# Patient Record
Sex: FEMALE | Race: WHITE | NOT HISPANIC OR LATINO
[De-identification: names, ages, dates, MRNs, and addresses within clinical notes are randomized per-mention and may not be internally consistent; named-entity substitution may affect disease eponyms.]

---

## 2018-11-01 PROBLEM — Z00.00 ENCOUNTER FOR PREVENTIVE HEALTH EXAMINATION: Status: ACTIVE | Noted: 2018-11-01

## 2018-11-05 ENCOUNTER — APPOINTMENT (OUTPATIENT)
Dept: OBGYN | Facility: CLINIC | Age: 28
End: 2018-11-05
Payer: COMMERCIAL

## 2018-11-05 VITALS — DIASTOLIC BLOOD PRESSURE: 78 MMHG | SYSTOLIC BLOOD PRESSURE: 100 MMHG

## 2018-11-05 DIAGNOSIS — Z80.41 FAMILY HISTORY OF MALIGNANT NEOPLASM OF OVARY: ICD-10-CM

## 2018-11-05 DIAGNOSIS — Z87.09 PERSONAL HISTORY OF OTHER DISEASES OF THE RESPIRATORY SYSTEM: ICD-10-CM

## 2018-11-05 DIAGNOSIS — E28.2 POLYCYSTIC OVARIAN SYNDROME: ICD-10-CM

## 2018-11-05 DIAGNOSIS — N64.4 MASTODYNIA: ICD-10-CM

## 2018-11-05 DIAGNOSIS — R23.2 FLUSHING: ICD-10-CM

## 2018-11-05 PROCEDURE — 99214 OFFICE O/P EST MOD 30 MIN: CPT

## 2018-11-06 PROBLEM — Z80.41 FAMILY HISTORY OF MALIGNANT NEOPLASM OF OVARY: Status: ACTIVE | Noted: 2018-11-05

## 2018-11-27 ENCOUNTER — FORM ENCOUNTER (OUTPATIENT)
Age: 28
End: 2018-11-27

## 2018-11-28 ENCOUNTER — OUTPATIENT (OUTPATIENT)
Dept: OUTPATIENT SERVICES | Facility: HOSPITAL | Age: 28
LOS: 1 days | End: 2018-11-28
Payer: COMMERCIAL

## 2018-11-28 ENCOUNTER — APPOINTMENT (OUTPATIENT)
Dept: ULTRASOUND IMAGING | Facility: CLINIC | Age: 28
End: 2018-11-28
Payer: COMMERCIAL

## 2018-11-28 DIAGNOSIS — Z00.8 ENCOUNTER FOR OTHER GENERAL EXAMINATION: ICD-10-CM

## 2018-11-28 PROCEDURE — 76830 TRANSVAGINAL US NON-OB: CPT

## 2018-11-28 PROCEDURE — 76830 TRANSVAGINAL US NON-OB: CPT | Mod: 26

## 2018-11-28 PROCEDURE — 76856 US EXAM PELVIC COMPLETE: CPT

## 2018-11-28 PROCEDURE — 76856 US EXAM PELVIC COMPLETE: CPT | Mod: 26

## 2018-11-29 ENCOUNTER — TRANSCRIPTION ENCOUNTER (OUTPATIENT)
Age: 28
End: 2018-11-29

## 2018-11-30 LAB
ESTRADIOL SERPL-MCNC: 53 PG/ML
FSH SERPL-MCNC: 6.5 IU/L
LH SERPL-ACNC: 7.6 IU/L
PROLACTIN SERPL-MCNC: 30.1 NG/ML
TSH SERPL-ACNC: 2.67 UIU/ML

## 2018-12-03 ENCOUNTER — APPOINTMENT (OUTPATIENT)
Dept: OBGYN | Facility: CLINIC | Age: 28
End: 2018-12-03

## 2018-12-06 ENCOUNTER — APPOINTMENT (OUTPATIENT)
Dept: OBGYN | Facility: CLINIC | Age: 28
End: 2018-12-06
Payer: COMMERCIAL

## 2018-12-06 PROCEDURE — 99214 OFFICE O/P EST MOD 30 MIN: CPT

## 2018-12-31 LAB
17OHP SERPL-MCNC: 28 NG/DL
ESTRADIOL SERPL HS-MCNC: 60 PG/ML
PROLACTIN SERPL-MCNC: 22.3 NG/ML
TESTOST SERPL-MCNC: 19.4 NG/DL

## 2019-01-02 ENCOUNTER — RESULT REVIEW (OUTPATIENT)
Age: 29
End: 2019-01-02

## 2019-03-14 ENCOUNTER — TRANSCRIPTION ENCOUNTER (OUTPATIENT)
Age: 29
End: 2019-03-14

## 2019-07-18 ENCOUNTER — APPOINTMENT (OUTPATIENT)
Dept: OBGYN | Facility: CLINIC | Age: 29
End: 2019-07-18
Payer: COMMERCIAL

## 2019-07-18 VITALS
BODY MASS INDEX: 20.89 KG/M2 | WEIGHT: 130 LBS | SYSTOLIC BLOOD PRESSURE: 120 MMHG | DIASTOLIC BLOOD PRESSURE: 82 MMHG | HEIGHT: 66 IN

## 2019-07-18 DIAGNOSIS — Z30.41 ENCOUNTER FOR SURVEILLANCE OF CONTRACEPTIVE PILLS: ICD-10-CM

## 2019-07-18 DIAGNOSIS — R31.29 OTHER MICROSCOPIC HEMATURIA: ICD-10-CM

## 2019-07-18 DIAGNOSIS — N83.299 OTHER OVARIAN CYST, UNSPECIFIED SIDE: ICD-10-CM

## 2019-07-18 LAB
BILIRUB UR QL STRIP: NORMAL
CLARITY UR: CLEAR
COLLECTION METHOD: NORMAL
GLUCOSE UR-MCNC: NORMAL
HCG UR QL: 0.2 EU/DL
HCG UR QL: NEGATIVE
HGB UR QL STRIP.AUTO: NORMAL
KETONES UR-MCNC: NORMAL
LEUKOCYTE ESTERASE UR QL STRIP: NORMAL
NITRITE UR QL STRIP: NORMAL
PH UR STRIP: 7
PROT UR STRIP-MCNC: NORMAL
QUALITY CONTROL: YES
SP GR UR STRIP: 1.01

## 2019-07-18 PROCEDURE — 99215 OFFICE O/P EST HI 40 MIN: CPT

## 2019-07-18 RX ORDER — LEVONORGESTREL AND ETHINYL ESTRADIOL 0.1-0.02MG
0.1-2 KIT ORAL
Refills: 0 | Status: COMPLETED | COMMUNITY
End: 2018-10-18

## 2019-07-18 NOTE — PROCEDURE
[Amenorrhea] : Amenorrhea [Transvaginal Ultrasound] : transvaginal ultrasound [Present] : uterus present [No Fibroid(s)] : no fibroid(s) [L: ___ cm] : L: [unfilled] cm [W: ___cm] : W: [unfilled] cm [H: ___ cm] : H: [unfilled] cm [FreeTextEntry3] : Complex 25 x 18 x 21 cm left ovarian cyst consistent with hemorrhaghic cyst or endometrioma. [FreeTextEntry5] : smooth muscle wall. ECHO 8 mm trilaminar, no polyps [FreeTextEntry6] : 10-15 3 mm follicles in right ovary. Left ovary with same few cyst but larger at 5-6 mm with complex cyst noted above. [FreeTextEntry7] : 2.9 x 1.9 x 1.5 cm  [FreeTextEntry8] : 3.6 x 2.7 x 2.5 cm  [FreeTextEntry4] : Left endometrioma or left ovarian cyst

## 2019-07-18 NOTE — PHYSICAL EXAM
[Normal] : uterus [No Bleeding] : there was no active vaginal bleeding [Anteversion] : anteverted [Tenderness] : tender [Enlarged ___ wks] : enlarged [unfilled] ~Uweeks [Mass ___ cm] : a [unfilled] ~Ucm uterine mass was palpated [Uterine Adnexae] : were not tender and not enlarged [No Tenderness] : no rectal tenderness [Occult Blood] : occult blood test from digital rectal exam was negative [FreeTextEntry7] : left adnexal mass and in cul de sac

## 2019-07-18 NOTE — CHIEF COMPLAINT
[Follow Up] : follow up GYN visit [FreeTextEntry1] : 30 yo G0 with LMP 4/20/19 presents with low back  and low abd pain. Since stopping OCP 11/2018 as it was not decreasing dysmenorrhea, pt has had 40-45 day cycles. With current amenorrhea, pt has pelvic pain thru month.

## 2019-07-19 LAB
C TRACH RRNA SPEC QL NAA+PROBE: NOT DETECTED
N GONORRHOEA RRNA SPEC QL NAA+PROBE: NOT DETECTED
SOURCE AMPLIFICATION: NORMAL

## 2019-08-27 ENCOUNTER — FORM ENCOUNTER (OUTPATIENT)
Age: 29
End: 2019-08-27

## 2019-08-28 ENCOUNTER — APPOINTMENT (OUTPATIENT)
Dept: ULTRASOUND IMAGING | Facility: CLINIC | Age: 29
End: 2019-08-28
Payer: COMMERCIAL

## 2019-08-28 ENCOUNTER — OUTPATIENT (OUTPATIENT)
Dept: OUTPATIENT SERVICES | Facility: HOSPITAL | Age: 29
LOS: 1 days | End: 2019-08-28
Payer: COMMERCIAL

## 2019-08-28 DIAGNOSIS — Z00.8 ENCOUNTER FOR OTHER GENERAL EXAMINATION: ICD-10-CM

## 2019-08-28 PROCEDURE — 76856 US EXAM PELVIC COMPLETE: CPT | Mod: 26

## 2019-08-28 PROCEDURE — 76775 US EXAM ABDO BACK WALL LIM: CPT

## 2019-08-28 PROCEDURE — 76856 US EXAM PELVIC COMPLETE: CPT

## 2019-08-28 PROCEDURE — 76775 US EXAM ABDO BACK WALL LIM: CPT | Mod: 26

## 2020-06-01 ENCOUNTER — APPOINTMENT (OUTPATIENT)
Dept: OBGYN | Facility: CLINIC | Age: 30
End: 2020-06-01
Payer: MEDICAID

## 2020-06-01 VITALS
DIASTOLIC BLOOD PRESSURE: 62 MMHG | BODY MASS INDEX: 20.09 KG/M2 | HEIGHT: 66 IN | SYSTOLIC BLOOD PRESSURE: 110 MMHG | WEIGHT: 125 LBS | TEMPERATURE: 98.2 F

## 2020-06-01 DIAGNOSIS — N92.6 IRREGULAR MENSTRUATION, UNSPECIFIED: ICD-10-CM

## 2020-06-01 LAB
HCG UR QL: NEGATIVE
HCG UR QL: NEGATIVE
QUALITY CONTROL: YES
QUALITY CONTROL: YES

## 2020-06-01 PROCEDURE — 99395 PREV VISIT EST AGE 18-39: CPT

## 2020-06-01 PROCEDURE — 81025 URINE PREGNANCY TEST: CPT

## 2020-06-01 NOTE — PHYSICAL EXAM
[Normal] : uterus [Uterine Adnexae] : were not tender and not enlarged [No Bleeding] : there was no active vaginal bleeding [FreeTextEntry5] : friable cervix

## 2020-06-01 NOTE — COUNSELING
[Menstrual Calendar] : menstrual calendar [Fertility Options] : fertility options [Contraception] : contraception

## 2020-06-01 NOTE — CHIEF COMPLAINT
[Follow Up] : follow up GYN visit [FreeTextEntry1] : 29 yo G0 with LM  5/2/20 presents b/c she began spotting 5/23 following intercourse and again on 5/31 following intercourse.

## 2020-06-05 LAB
C TRACH RRNA SPEC QL NAA+PROBE: NOT DETECTED
HPV HIGH+LOW RISK DNA PNL CVX: NOT DETECTED
N GONORRHOEA RRNA SPEC QL NAA+PROBE: NOT DETECTED
SOURCE AMPLIFICATION: NORMAL

## 2020-07-27 ENCOUNTER — APPOINTMENT (OUTPATIENT)
Dept: OBGYN | Facility: CLINIC | Age: 30
End: 2020-07-27

## 2021-01-28 ENCOUNTER — APPOINTMENT (OUTPATIENT)
Dept: OBGYN | Facility: CLINIC | Age: 31
End: 2021-01-28
Payer: MEDICAID

## 2021-01-28 VITALS — WEIGHT: 125 LBS | BODY MASS INDEX: 20.09 KG/M2 | HEIGHT: 66 IN

## 2021-01-28 DIAGNOSIS — N91.2 AMENORRHEA, UNSPECIFIED: ICD-10-CM

## 2021-01-28 DIAGNOSIS — R10.2 PELVIC AND PERINEAL PAIN: ICD-10-CM

## 2021-01-28 LAB
HCG UR QL: POSITIVE
QUALITY CONTROL: YES

## 2021-01-28 PROCEDURE — 99214 OFFICE O/P EST MOD 30 MIN: CPT

## 2021-01-28 PROCEDURE — 99072 ADDL SUPL MATRL&STAF TM PHE: CPT

## 2021-01-28 PROCEDURE — 81025 URINE PREGNANCY TEST: CPT

## 2021-01-28 NOTE — DISCUSSION/SUMMARY
[FreeTextEntry1] : Unexpected 17-18 wk IU pregnancy by US\par pregnancy options discussed with patient\par cont MV until decision made. Offered PNV\par Phone number of person who would do second trimester surgical TOP.\par Options and testing including anatomy US for mid second trimester\par \par RTO 1 week for NPN or following TOP.

## 2021-01-28 NOTE — PROCEDURE
[Pelvic Pain] : pelvic pain [Amenorrhea] : Amenorrhea [Transvaginal Ultrasound] : transvaginal ultrasound [No Fibroid(s)] : no fibroid(s) [Not Visualized] : not visualized

## 2021-01-28 NOTE — HISTORY OF PRESENT ILLNESS
[FreeTextEntry1] : 31 yo with LMP 12/25/20 presents with painful breast swelling and LLQ pain.\par \par Prior ovarian cyst resolved.\par Pt stopped OCP June 2020 as she noted less pain when off pill. Pt and fiance had been using condoms.\par \par Menses off pill are 40-50 days at times. Same BF 15 years. Now fiance, but was not trying to conceive yet. \par \par Bleeding 12/25 was light but cycle have  been light recently.  [PapSmeardate] : 6/1/20 [GonorrheaDate] : 6/1/20 [ChlamydiaDate] : 6/1/20

## 2021-01-28 NOTE — PROCEDURE
[Affirm (Triple Culture)] : Affirm (triple culture) [FreeTextEntry3] : intercerebellar 17.5wks\par BPD 18.1wks 4 cm \par AC 16.6wks\par Fl    18.5wks [FreeTextEntry4] : 17 w 6 d viable IUP with movement. EDD7/2/21\par Visualized FHR

## 2021-01-28 NOTE — PHYSICAL EXAM
[Labia Majora] : normal [Labia Minora] : normal [Normal] : normal [Enlarged ___ wks] : enlarged [unfilled] ~Uweeks [Uterine Adnexae] : normal

## 2021-02-04 ENCOUNTER — APPOINTMENT (OUTPATIENT)
Dept: OBGYN | Facility: CLINIC | Age: 31
End: 2021-02-04

## 2021-02-04 ENCOUNTER — OUTPATIENT (OUTPATIENT)
Dept: OUTPATIENT SERVICES | Facility: HOSPITAL | Age: 31
LOS: 1 days | End: 2021-02-04
Payer: MEDICAID

## 2021-02-04 VITALS
TEMPERATURE: 98 F | OXYGEN SATURATION: 100 % | HEART RATE: 72 BPM | DIASTOLIC BLOOD PRESSURE: 56 MMHG | RESPIRATION RATE: 14 BRPM | HEIGHT: 66 IN | SYSTOLIC BLOOD PRESSURE: 116 MMHG | WEIGHT: 125 LBS

## 2021-02-04 DIAGNOSIS — Z01.818 ENCOUNTER FOR OTHER PREPROCEDURAL EXAMINATION: ICD-10-CM

## 2021-02-04 DIAGNOSIS — Z33.2 ENCOUNTER FOR ELECTIVE TERMINATION OF PREGNANCY: ICD-10-CM

## 2021-02-04 DIAGNOSIS — Z98.890 OTHER SPECIFIED POSTPROCEDURAL STATES: Chronic | ICD-10-CM

## 2021-02-04 LAB
ANION GAP SERPL CALC-SCNC: 14 MMOL/L — SIGNIFICANT CHANGE UP (ref 5–17)
APTT BLD: 27.1 SEC — LOW (ref 27.5–35.5)
BLD GP AB SCN SERPL QL: NEGATIVE — SIGNIFICANT CHANGE UP
BUN SERPL-MCNC: 8 MG/DL — SIGNIFICANT CHANGE UP (ref 7–23)
CALCIUM SERPL-MCNC: 9.1 MG/DL — SIGNIFICANT CHANGE UP (ref 8.4–10.5)
CHLORIDE SERPL-SCNC: 101 MMOL/L — SIGNIFICANT CHANGE UP (ref 96–108)
CO2 SERPL-SCNC: 20 MMOL/L — LOW (ref 22–31)
CREAT SERPL-MCNC: 0.58 MG/DL — SIGNIFICANT CHANGE UP (ref 0.5–1.3)
FIBRINOGEN PPP-MCNC: 489 MG/DL — SIGNIFICANT CHANGE UP (ref 290–520)
GLUCOSE SERPL-MCNC: 79 MG/DL — SIGNIFICANT CHANGE UP (ref 70–99)
HCT VFR BLD CALC: 31.7 % — LOW (ref 34.5–45)
HGB BLD-MCNC: 10.7 G/DL — LOW (ref 11.5–15.5)
INR BLD: 0.97 RATIO — SIGNIFICANT CHANGE UP (ref 0.88–1.16)
MCHC RBC-ENTMCNC: 30.6 PG — SIGNIFICANT CHANGE UP (ref 27–34)
MCHC RBC-ENTMCNC: 33.8 GM/DL — SIGNIFICANT CHANGE UP (ref 32–36)
MCV RBC AUTO: 90.6 FL — SIGNIFICANT CHANGE UP (ref 80–100)
NRBC # BLD: 0 /100 WBCS — SIGNIFICANT CHANGE UP (ref 0–0)
PLATELET # BLD AUTO: 246 K/UL — SIGNIFICANT CHANGE UP (ref 150–400)
POTASSIUM SERPL-MCNC: 3.3 MMOL/L — LOW (ref 3.5–5.3)
POTASSIUM SERPL-SCNC: 3.3 MMOL/L — LOW (ref 3.5–5.3)
PROTHROM AB SERPL-ACNC: 11.6 SEC — SIGNIFICANT CHANGE UP (ref 10.6–13.6)
RBC # BLD: 3.5 M/UL — LOW (ref 3.8–5.2)
RBC # FLD: 12.3 % — SIGNIFICANT CHANGE UP (ref 10.3–14.5)
RH IG SCN BLD-IMP: POSITIVE — SIGNIFICANT CHANGE UP
SODIUM SERPL-SCNC: 135 MMOL/L — SIGNIFICANT CHANGE UP (ref 135–145)
WBC # BLD: 10.84 K/UL — HIGH (ref 3.8–10.5)
WBC # FLD AUTO: 10.84 K/UL — HIGH (ref 3.8–10.5)

## 2021-02-04 PROCEDURE — 85027 COMPLETE CBC AUTOMATED: CPT

## 2021-02-04 PROCEDURE — 86850 RBC ANTIBODY SCREEN: CPT

## 2021-02-04 PROCEDURE — 85384 FIBRINOGEN ACTIVITY: CPT

## 2021-02-04 PROCEDURE — 86900 BLOOD TYPING SEROLOGIC ABO: CPT

## 2021-02-04 PROCEDURE — 80048 BASIC METABOLIC PNL TOTAL CA: CPT

## 2021-02-04 PROCEDURE — G0463: CPT

## 2021-02-04 PROCEDURE — 85730 THROMBOPLASTIN TIME PARTIAL: CPT

## 2021-02-04 PROCEDURE — 86901 BLOOD TYPING SEROLOGIC RH(D): CPT

## 2021-02-04 PROCEDURE — 85610 PROTHROMBIN TIME: CPT

## 2021-02-04 RX ORDER — SODIUM CHLORIDE 9 MG/ML
3 INJECTION INTRAMUSCULAR; INTRAVENOUS; SUBCUTANEOUS EVERY 8 HOURS
Refills: 0 | Status: DISCONTINUED | OUTPATIENT
Start: 2021-02-09 | End: 2021-02-23

## 2021-02-04 RX ORDER — LIDOCAINE HCL 20 MG/ML
0.2 VIAL (ML) INJECTION ONCE
Refills: 0 | Status: DISCONTINUED | OUTPATIENT
Start: 2021-02-09 | End: 2021-02-23

## 2021-02-04 RX ORDER — VANCOMYCIN HCL 1 G
750 VIAL (EA) INTRAVENOUS ONCE
Refills: 0 | Status: DISCONTINUED | OUTPATIENT
Start: 2021-02-09 | End: 2021-02-23

## 2021-02-04 NOTE — H&P PST ADULT - HISTORY OF PRESENT ILLNESS
30 yr old female with PMH of  30 yr old female (LMP: 2020),  with PMH of Asthma (mild: last asthma attack was more than 5-6 yrs ago), Ovarian cyst. Pt offers no complaints at this time. Pt evaluated by Dr. Jimenez for a scheduled D&E with Ultrasound guidance on 21.    **Covid swab scheduled on 21 at [facility]*

## 2021-02-04 NOTE — H&P PST ADULT - NSICDXFAMILYHX_GEN_ALL_CORE_FT
FAMILY HISTORY:  Family history of ulcerative colitis, mother: has Ostomy  FH: asthma, brother  FH: HTN (hypertension), Mother & Father  FH: prostate cancer, father

## 2021-02-04 NOTE — H&P PST ADULT - NSICDXPASTSURGICALHX_GEN_ALL_CORE_FT
PAST SURGICAL HISTORY:  S/P knee surgery B/L arthroscopic left 2003, right  2004    Status post D&C polypectomy

## 2021-02-04 NOTE — H&P PST ADULT - NSICDXPROBLEM_GEN_ALL_CORE_FT
PROBLEM DIAGNOSES  Problem: Encounter for elective termination of pregnancy  Assessment and Plan: -scheduled for a D&E with Ultrasound Guidance on 2/9/21  -preop instructions given  -Labs: CBC, BMP, PTT/PT/INR, Fibrinogen done in PST

## 2021-02-04 NOTE — H&P PST ADULT - NSANTHOSAYNRD_GEN_A_CORE
No. SHEELA screening performed.  STOP BANG Legend: 0-2 = LOW Risk; 3-4 = INTERMEDIATE Risk; 5-8 = HIGH Risk

## 2021-02-06 ENCOUNTER — OUTPATIENT (OUTPATIENT)
Dept: OUTPATIENT SERVICES | Facility: HOSPITAL | Age: 31
LOS: 1 days | End: 2021-02-06
Payer: MEDICAID

## 2021-02-06 DIAGNOSIS — Z11.52 ENCOUNTER FOR SCREENING FOR COVID-19: ICD-10-CM

## 2021-02-06 DIAGNOSIS — Z98.890 OTHER SPECIFIED POSTPROCEDURAL STATES: Chronic | ICD-10-CM

## 2021-02-06 LAB — SARS-COV-2 RNA SPEC QL NAA+PROBE: SIGNIFICANT CHANGE UP

## 2021-02-06 PROCEDURE — C9803: CPT

## 2021-02-06 PROCEDURE — U0003: CPT

## 2021-02-06 PROCEDURE — U0005: CPT

## 2021-02-08 ENCOUNTER — TRANSCRIPTION ENCOUNTER (OUTPATIENT)
Age: 31
End: 2021-02-08

## 2021-02-08 ENCOUNTER — APPOINTMENT (OUTPATIENT)
Dept: OBGYN | Facility: CLINIC | Age: 31
End: 2021-02-08
Payer: MEDICAID

## 2021-02-08 VITALS
TEMPERATURE: 97.3 F | HEIGHT: 66 IN | WEIGHT: 125 LBS | SYSTOLIC BLOOD PRESSURE: 100 MMHG | BODY MASS INDEX: 20.09 KG/M2 | DIASTOLIC BLOOD PRESSURE: 60 MMHG

## 2021-02-08 DIAGNOSIS — Z34.90 ENCOUNTER FOR SUPERVISION OF NORMAL PREGNANCY, UNSPECIFIED, UNSPECIFIED TRIMESTER: ICD-10-CM

## 2021-02-08 DIAGNOSIS — N92.6 IRREGULAR MENSTRUATION, UNSPECIFIED: ICD-10-CM

## 2021-02-08 PROCEDURE — 99204 OFFICE O/P NEW MOD 45 MIN: CPT | Mod: TH,25

## 2021-02-08 PROCEDURE — 76815 OB US LIMITED FETUS(S): CPT

## 2021-02-08 PROCEDURE — 99072 ADDL SUPL MATRL&STAF TM PHE: CPT

## 2021-02-08 RX ORDER — MISOPROSTOL 200 UG/1
200 TABLET ORAL
Qty: 2 | Refills: 0 | Status: ACTIVE | COMMUNITY
Start: 2021-02-08 | End: 1900-01-01

## 2021-02-08 NOTE — PROCEDURE
[Transabdominal OB Sonogram] : Transabdominal OB Sonogram [Intrauterine Pregnancy] : intrauterine pregnancy [Fetal Heart] : fetal heart present [FreeTextEntry1] : BPD: 4.22cm\par FL: 3.00cm\par AC: 12.9cm

## 2021-02-08 NOTE — HISTORY OF PRESENT ILLNESS
[FreeTextEntry1] : 31yo  LMP 2020 at 18w5d EGA presenting for consultation for D&E. Denies abdominal pain, vaginal bleeding, leakage of fluid.\par \par OBHx: Denies\par Gynhx: +hx of cysts, PCOS; in the past few months, periods are more regular q30day\par Denies hx of abnormal paps, STIs, Fibroids, Endometriosis\par \par PMH: Asthma, rarely uses home Proair\par PSH: Vaginal polypectomy ()

## 2021-02-08 NOTE — PHYSICAL EXAM
[Appropriately responsive] : appropriately responsive [Alert] : alert [No Acute Distress] : no acute distress [Soft] : soft [Non-tender] : non-tender [Non-distended] : non-distended [Oriented x3] : oriented x3 [Labia Majora] : normal [Labia Minora] : normal [Normal] : normal [Normal Position] : in a normal position

## 2021-02-08 NOTE — DISCUSSION/SUMMARY
[FreeTextEntry1] : 29yo  LMP 2020 at 18w5d EGA presenting for consultation for D&E. \par \par 1.	Dilation and Evacuation \par -All available records and ultrasounds have been reviewed \par - Pt does not desire induction of labor\par - reviewed patient’s responsibility to contact insurance company for coverage confirmation\par -All consents signed today, all questions/concerns addressed\par - Patient offered pamphlet for support services: patient accepted\par - Patient offered fetal footprints: patient declined\par - Genetics: patient declines\par - Reviewed disposal of remains, hospital vs. private burial.  Patient desires routine hospital disposal\par \par 2. Surgery scheduling\par - Patient to be precertified for D+E\par - D+E scheduled for  in ambulatory surgery\par - PST completed\par \par 3. ID/Cervical prep\par - GC/CT obtained\par - rx misoprostol 400 mcg to be placed buccally 3 hours pre op\par -mifepristone administered in office today: #[phone]\par - pt declines HIV/RPR/hepatitis testing\par - Flagyl 500 mg \par - Prescription for Percocet 5/325 mg #6  with no refills given\par - Istop authorization #: ____________\par - Reviewed Ibuprofen 600 mg po q 6 hours \par \par 4. Labs/Blood type\par - to get CBC, T+S at PST’s\par - Rhogam pending results\par \par 5. Contraception\par - Patient counseled on all contraceptive options\par - Patient does not desire post termination contraception\par \par 6. Post-op\par - Post-operative phone call virtual visit to be scheduled in 2 weeks\par - Post-operative instruction sheet given, reviewed bleeding and infection precautions\par - Provided 24 hour contact phone number\par - All questions/concerns addressed\par

## 2021-02-09 ENCOUNTER — RESULT REVIEW (OUTPATIENT)
Age: 31
End: 2021-02-09

## 2021-02-09 ENCOUNTER — APPOINTMENT (OUTPATIENT)
Dept: OBGYN | Facility: CLINIC | Age: 31
End: 2021-02-09

## 2021-02-09 ENCOUNTER — OUTPATIENT (OUTPATIENT)
Dept: OUTPATIENT SERVICES | Facility: HOSPITAL | Age: 31
LOS: 1 days | End: 2021-02-09
Payer: MEDICAID

## 2021-02-09 VITALS
RESPIRATION RATE: 14 BRPM | SYSTOLIC BLOOD PRESSURE: 104 MMHG | HEART RATE: 65 BPM | TEMPERATURE: 98 F | DIASTOLIC BLOOD PRESSURE: 61 MMHG | OXYGEN SATURATION: 100 %

## 2021-02-09 VITALS
RESPIRATION RATE: 14 BRPM | WEIGHT: 125 LBS | TEMPERATURE: 98 F | OXYGEN SATURATION: 100 % | HEART RATE: 73 BPM | SYSTOLIC BLOOD PRESSURE: 101 MMHG | HEIGHT: 66 IN | DIASTOLIC BLOOD PRESSURE: 61 MMHG

## 2021-02-09 DIAGNOSIS — Z98.890 OTHER SPECIFIED POSTPROCEDURAL STATES: Chronic | ICD-10-CM

## 2021-02-09 DIAGNOSIS — Z33.2 ENCOUNTER FOR ELECTIVE TERMINATION OF PREGNANCY: ICD-10-CM

## 2021-02-09 LAB
BLD GP AB SCN SERPL QL: NEGATIVE — SIGNIFICANT CHANGE UP
RH IG SCN BLD-IMP: POSITIVE — SIGNIFICANT CHANGE UP

## 2021-02-09 PROCEDURE — 88305 TISSUE EXAM BY PATHOLOGIST: CPT

## 2021-02-09 PROCEDURE — 76998 US GUIDE INTRAOP: CPT | Mod: 26

## 2021-02-09 PROCEDURE — 86850 RBC ANTIBODY SCREEN: CPT

## 2021-02-09 PROCEDURE — 59840 INDUCED ABORTION D&C: CPT

## 2021-02-09 PROCEDURE — 86900 BLOOD TYPING SEROLOGIC ABO: CPT

## 2021-02-09 PROCEDURE — 86901 BLOOD TYPING SEROLOGIC RH(D): CPT

## 2021-02-09 PROCEDURE — 59841 INDUCED ABORTION DILAT&EVAC: CPT

## 2021-02-09 PROCEDURE — 88305 TISSUE EXAM BY PATHOLOGIST: CPT | Mod: 26

## 2021-02-09 RX ORDER — ACETAMINOPHEN 500 MG
2 TABLET ORAL
Qty: 0 | Refills: 0 | DISCHARGE

## 2021-02-09 RX ORDER — ONDANSETRON 8 MG/1
4 TABLET, FILM COATED ORAL ONCE
Refills: 0 | Status: DISCONTINUED | OUTPATIENT
Start: 2021-02-09 | End: 2021-02-23

## 2021-02-09 RX ORDER — HYDROMORPHONE HYDROCHLORIDE 2 MG/ML
0.5 INJECTION INTRAMUSCULAR; INTRAVENOUS; SUBCUTANEOUS
Refills: 0 | Status: DISCONTINUED | OUTPATIENT
Start: 2021-02-09 | End: 2021-02-09

## 2021-02-09 RX ORDER — SODIUM CHLORIDE 9 MG/ML
1000 INJECTION, SOLUTION INTRAVENOUS
Refills: 0 | Status: DISCONTINUED | OUTPATIENT
Start: 2021-02-09 | End: 2021-02-23

## 2021-02-09 RX ORDER — IBUPROFEN 200 MG
1 TABLET ORAL
Qty: 0 | Refills: 0 | DISCHARGE

## 2021-02-09 NOTE — BRIEF OPERATIVE NOTE - NSICDXBRIEFPROCEDURE_GEN_ALL_CORE_FT
PROCEDURES:  Dilation and evacuation of uterus using suction with ultrasound guidance 09-Feb-2021 15:05:54  Frankel, Robyn   PROCEDURES:  Induced  by dilation and evacuation 2021 16:06:44 1. examination under anesthesia  2. standard dilation and evacuation under ultrasound guidance Mirna Douglass

## 2021-02-09 NOTE — ASU DISCHARGE PLAN (ADULT/PEDIATRIC) - ACTIVITY LEVEL
No excercise/No heavy lifting/No sports/gym/Nothing per rectum/Nothing per vagina/No tub baths/No douching/No tampons/No intercourse

## 2021-02-09 NOTE — BRIEF OPERATIVE NOTE - OPERATION/FINDINGS
EUA revealed anteverted uterus, approximately 18wk size  Fetus and placenta c/w gestational age; all fetal parts accounted for. Entire procedure completed under ultrasound guidance. Thin endometrial strip at completion of procedure.   Monsel solution applied to cervix for hemostasis. Good hemostasis noted. anteverted uterus, approximately 18-19wk size  Fetus and placenta c/w gestational age; all fetal parts accounted for. Entire procedure completed under ultrasound guidance. Thin endometrial strip at completion of procedure.   Monsel solution applied to cervix for hemostasis. Good hemostasis noted. anteverted uterus, approximately 18-19wk size.  fetus and placenta AGA, all fetal parts accounted for. Monsel solution applied to cervix. BT: O+

## 2021-02-09 NOTE — ASU DISCHARGE PLAN (ADULT/PEDIATRIC) - ASU DC SPECIAL INSTRUCTIONSFT
Please call to schedule an appointment in 2 weeks with Dr. Douglass. Please call to schedule an appointment in 2 weeks with Dr. Douglass.    Please note: a brown solution was placed on your cervix to help prevent bleeding. You may experience some brown-floyd discharge for the next 4-5 days. This is normal and to be expected.

## 2021-02-09 NOTE — ASU DISCHARGE PLAN (ADULT/PEDIATRIC) - CARE PROVIDER_API CALL
Mirna Douglass)  OBSGYN  General  [address] [place] [id number]  Phone: [phone]  Fax: [phone]  Established Patient  Follow Up Time: 2 weeks

## 2021-02-09 NOTE — BRIEF OPERATIVE NOTE - NSICDXBRIEFPREOP_GEN_ALL_CORE_FT
PRE-OP DIAGNOSIS:  Unplanned pregnancy 09-Feb-2021 15:06:05  Frankel, Robyn   PRE-OP DIAGNOSIS:  Unplanned pregnancy 09-Feb-2021 15:06:05 1. IUP @ 18 6/7 weeks  2. unplanned, undesired pregnancy Frankel, Robyn

## 2021-02-11 ENCOUNTER — NON-APPOINTMENT (OUTPATIENT)
Age: 31
End: 2021-02-11

## 2021-02-16 PROBLEM — J45.909 UNSPECIFIED ASTHMA, UNCOMPLICATED: Chronic | Status: ACTIVE | Noted: 2021-02-04

## 2021-02-16 PROBLEM — N83.209 UNSPECIFIED OVARIAN CYST, UNSPECIFIED SIDE: Chronic | Status: ACTIVE | Noted: 2021-02-04

## 2021-02-24 ENCOUNTER — APPOINTMENT (OUTPATIENT)
Dept: OBGYN | Facility: CLINIC | Age: 31
End: 2021-02-24
Payer: MEDICAID

## 2021-02-24 DIAGNOSIS — Z09 ENCOUNTER FOR FOLLOW-UP EXAMINATION AFTER COMPLETED TREATMENT FOR CONDITIONS OTHER THAN MALIGNANT NEOPLASM: ICD-10-CM

## 2021-02-24 PROCEDURE — 99212 OFFICE O/P EST SF 10 MIN: CPT | Mod: 95

## 2021-02-27 LAB — SURGICAL PATHOLOGY STUDY: SIGNIFICANT CHANGE UP

## 2021-03-11 ENCOUNTER — NON-APPOINTMENT (OUTPATIENT)
Age: 31
End: 2021-03-11

## 2023-03-22 NOTE — COUNSELING
[Vitamins/Supplements] : vitamins/supplements [Smoking Cessation] : smoking cessation [Drugs/Alcohol] : drugs, alcohol [Cane] : ambulates with cane [Pregnancy Options] : pregnancy options [Antalgic] : not antalgic [Medication Management] : medication management [Stooped] : not stooped [de-identified] : Incision is healed.  Stable neurologic exam. [de-identified] : Review of his lumbar spine MRI reveals moderate to severe stenosis from L3-5 with L4-5 spondylolisthesis\par \par AP lateral lumbar x-rays reveals instrumented L4-5 fusion.  Improved posterior lateral arthrodesis.

## 2023-11-09 ENCOUNTER — NON-APPOINTMENT (OUTPATIENT)
Age: 33
End: 2023-11-09

## 2023-11-10 ENCOUNTER — APPOINTMENT (OUTPATIENT)
Dept: ORTHOPEDIC SURGERY | Facility: CLINIC | Age: 33
End: 2023-11-10
Payer: MEDICAID

## 2023-11-10 ENCOUNTER — NON-APPOINTMENT (OUTPATIENT)
Age: 33
End: 2023-11-10

## 2023-11-10 VITALS — WEIGHT: 125 LBS | BODY MASS INDEX: 20.09 KG/M2 | HEIGHT: 66 IN

## 2023-11-10 PROCEDURE — 99204 OFFICE O/P NEW MOD 45 MIN: CPT

## 2023-12-21 ENCOUNTER — NON-APPOINTMENT (OUTPATIENT)
Age: 33
End: 2023-12-21

## 2023-12-22 ENCOUNTER — APPOINTMENT (OUTPATIENT)
Dept: ORTHOPEDIC SURGERY | Facility: CLINIC | Age: 33
End: 2023-12-22
Payer: MEDICAID

## 2023-12-22 VITALS
HEART RATE: 73 BPM | HEIGHT: 66 IN | WEIGHT: 125 LBS | SYSTOLIC BLOOD PRESSURE: 117 MMHG | BODY MASS INDEX: 20.09 KG/M2 | DIASTOLIC BLOOD PRESSURE: 68 MMHG

## 2023-12-22 DIAGNOSIS — M25.561 PAIN IN RIGHT KNEE: ICD-10-CM

## 2023-12-22 DIAGNOSIS — M25.562 PAIN IN RIGHT KNEE: ICD-10-CM

## 2023-12-22 DIAGNOSIS — M23.92 UNSPECIFIED INTERNAL DERANGEMENT OF LEFT KNEE: ICD-10-CM

## 2023-12-22 PROCEDURE — 99214 OFFICE O/P EST MOD 30 MIN: CPT | Mod: 25

## 2023-12-22 PROCEDURE — 73564 X-RAY EXAM KNEE 4 OR MORE: CPT | Mod: LT

## 2023-12-22 RX ORDER — MELOXICAM 15 MG/1
15 TABLET ORAL DAILY
Qty: 30 | Refills: 1 | Status: ACTIVE | COMMUNITY
Start: 2023-12-22 | End: 1900-01-01

## 2023-12-22 NOTE — DISCUSSION/SUMMARY
[de-identified] : Left knee internal arrangement, right knee pain  Extensive discussion of the natural history of this issue was had with the patient.  We discussed the treatment options focusing on conservative therapy which includes anti-inflammatories, physical therapy/home exercise, & activity modification.   -A prescription for meloxicam with the appropriate warnings for GI, cardiac, and renal side effects was given to the patient.  Patient was instructed not to use any other NSAIDs with this medication. To further evaluate this injury I would like an MRI of her left knee to evaluate her meniscus due to her exam and negative x-rays. Patient will return after the MRI is completed.  The patient's current medication management of their orthopedic diagnosis was reviewed today: (1) We discussed a comprehensive treatment plan that included possible pharmaceutical management involving the use of prescription strength medications including but not limited to options such as oral Ibuprofen 400mg QID, once daily Meloxicam 15 mg, or 500-650 mg Tylenol versus over the counter oral medications and topical prescription NSAID Pennsaid vs over the counter Voltaren gel. (2) There is a moderate risk of morbidity with further treatment, especially from use of prescription strength medications and possible side effects of these medications which include upset stomach with oral medications, skin reactions to topical medications and cardiac/renal issues with long term use. (3) I recommended that the patient follow-up with their medical physician to discuss any significant specific potential issues with long term medication use such as interactions with current medications or with exacerbation of underlying medical comorbidities. (4) The benefits and risks associated with use of injectable, oral or topical, prescription and over the counter anti-inflammatory medications were discussed with the patient. The patient voiced understanding of the risks including but not limited to bleeding, stroke, kidney dysfunction, heart disease, and were referred to the black box warning label for further information.

## 2023-12-22 NOTE — HISTORY OF PRESENT ILLNESS
[de-identified] : 12/22/23: Patient presents with bilateral knee pain left significant worse than right for 3 weeks.  States pain is on medial aspect of her knees.  Does feel buckling.  Taking Advil which only provides minimal relief.  Does have a history of bilateral patella stabilization surgery 20 years ago.  Denies allergies anticoagulation past medical-asthma

## 2023-12-22 NOTE — PHYSICAL EXAM
[de-identified] : Bilateral lower extremity Hip: Normal ROM without pain on IR/ER Knee Inspection: no effusion, no erythema. Wounds: None. Alignment: neutral. Palpation: Tender to palpation medial joint line. ROM active (in degrees): 0-140 Ligamentous laxity: stable to varus stress test, stable to valgus stress test Meniscal Test: Positive McMurrays left more than right, negative Alexis. Muscle Test: good quad strength. [de-identified] : 12/22/23: Bilateral knee xrays, standing AP/Lateral and Merchant films, and 45 degree PA standing view taken today in the office are reviewed and demonstrate preserved joint space, no abnormalities

## 2024-01-18 ENCOUNTER — OUTPATIENT (OUTPATIENT)
Dept: OUTPATIENT SERVICES | Facility: HOSPITAL | Age: 34
LOS: 1 days | End: 2024-01-18
Payer: MEDICAID

## 2024-01-18 ENCOUNTER — APPOINTMENT (OUTPATIENT)
Dept: MRI IMAGING | Facility: CLINIC | Age: 34
End: 2024-01-18
Payer: MEDICAID

## 2024-01-18 DIAGNOSIS — Z98.890 OTHER SPECIFIED POSTPROCEDURAL STATES: Chronic | ICD-10-CM

## 2024-01-18 DIAGNOSIS — M23.92 UNSPECIFIED INTERNAL DERANGEMENT OF LEFT KNEE: ICD-10-CM

## 2024-01-18 PROCEDURE — 73721 MRI JNT OF LWR EXTRE W/O DYE: CPT | Mod: 26,LT

## 2024-01-18 PROCEDURE — 73721 MRI JNT OF LWR EXTRE W/O DYE: CPT

## 2024-01-26 ENCOUNTER — APPOINTMENT (OUTPATIENT)
Dept: ORTHOPEDIC SURGERY | Facility: CLINIC | Age: 34
End: 2024-01-26
Payer: MEDICAID

## 2024-01-26 DIAGNOSIS — M79.672 PAIN IN LEFT FOOT: ICD-10-CM

## 2024-01-26 DIAGNOSIS — M77.52 OTHER ENTHESOPATHY OF LT FOOT AND ANKLE: ICD-10-CM

## 2024-01-26 PROCEDURE — 99213 OFFICE O/P EST LOW 20 MIN: CPT

## 2024-01-26 NOTE — END OF VISIT
[FreeTextEntry3] : I, Deepali Russell, acted solely as a scribe for Dr. Bhargav Marvin on 01/26/2024

## 2024-01-26 NOTE — DISCUSSION/SUMMARY
[Medication Risks Reviewed] : Medication risks reviewed [de-identified] : "Today I had a lengthy discussion with the patient regarding their left great toe pain. I have addressed all the patient's concerns surrounding the pathology of their condition. We reviewed the pt's MRI of the left great toe.   Plan:  1. Activity and shoe modification.  2. ICE therapy and OTC anti-inflammatories (such as oral and voltaren gel).  3. F/u as needed.   The patient understood and verbally agreed to the treatment plan. All of their questions were answered and they were satisfied with the visit. The patient should call the office if they have any questions or experience worsening symptoms."

## 2024-01-26 NOTE — HISTORY OF PRESENT ILLNESS
[FreeTextEntry1] : 1/26/2024: The patient is a 33-year-old female who presents for follow-up evaluation of her left great toe pain.  11/10/2023: The patient is a 33-year-old female who presents with left great toe pain.  She is referred here by Dr. Carola Taylor who has been treating her for the past 2 months.  She has an MRI from September of her left foot which shows a longitudinal split tear of the flexor houses longus of the great toe.  The patient presents in a short cam walking boot which she states she has been in for 2 months.  She states that the toe is very stiff and she does still have pain.  She is walking with the boot without assistance.  She does state that she is " sick of the boot" and she would really like to get out of the boot if she can as soon as possible.  She does have a trip coming up in the near future.  She does state that the toe is still sensitive.  No other complaints.

## 2024-01-26 NOTE — PHYSICAL EXAM
[de-identified] : Left foot Physical Examination:  General: Alert and oriented x3.  In no acute distress.  Pleasant in nature with a normal affect.  No apparent respiratory distress.  Erythema, Warmth, Rubor: Negative Swelling: Negative  ROM Ankle: 1. Dorsiflexion: 10 degrees 2. Plantarflexion: 40 degrees 3. Inversion: 30 degrees 4. Eversion: 30 degrees  ROM of digits: Normal  Pes Planus: Negative Pes Cavus: Negative  Bunion: Negative Tailor's Bunion (Bunionette): Negative Hammer Toe Deformity/Deformities: Negative  Tenderness to Palpation:  1. Heel Pain: Negative 2. Midfoot Pain: Negative 3. First MTP Joint: Negative 4. Lis Franc Joint: Negative  Tenderness Metatarsals: 1st MT: Negative 2nd MT: Negative 3rd MT: Negative 4th MT: Negative 5th MT: Negative Base of the 5th MT: Negative  Ligament Pain: 1. Lis Franc Ligament: Negative 2. Plantar Fascia Ligament: Negative  Strength:  5/5 TA/GS/EHL/FHL/EDL/ADD/ABD  Pulses: 2+ DP/PT Pulses  Capillary Refill Toes: <2 seconds  Neuro: Intact motor and sensory throughout  Additional Test: 1. Powers's Squeeze Test: Negative 2. Calcaneal Squeeze Test: Negative  *The patient has pain in the plantar aspect of the great toe just superior to the first MTPJ over the FHL tendon.  Extension of the toe to about 40 degrees causes the patient discomfort over the FHL tendon.  The patient has no pain at the first MTPJ. [de-identified] : Exam requested by: JEANNINE HANSON DPMARTY [address] [facility] [address] SITE PERFORMED: [facility] Patient: SHARMIN HARPER YOB: 1990 Phone: [phone] MRN: [id number] Acc: [id number] Date of Exam: 09-   EXAM:  MRI LEFT FOREFOOT WITHOUT CONTRAST  HISTORY: Pain and swelling for 3 months along the plantar aspect of the first interphalangeal joint.  TECHNIQUE:  Multiplanar, multi-sequential MRI of the left forefoot was performed on a 3T scanner according to standard protocol.  COMPARISON:  None available  FINDINGS:  Osseous structures:  No acute fracture or osteonecrosis. Round low signal 3 mm focus along the dorsal medial aspect of the first distal phalangeal tuft, without aggressive features, nonspecific, may represent sequelae of prior trauma or focus of enostosis.   Hallux: First metatarsophalangeal joint: No joint effusion. No focal high-grade articular cartilage defect or underlying subchondral marrow signal abnormality. The plantar plate is intact. First interphalangeal joint: No joint effusion. No focal articular cartilage defect or underlying subchondral marrow signal abnormality. Mild apparent thickening and increased signal of the first interphalangeal joint plantar plate without discrete tear.  Sesamoids: Unremarkable without fracture, bone marrow edema, or evidence of avascular necrosis.  Lesser rays: Metatarsophalangeal joints: Unremarkable. Interphalangeal joints: Unremarkable. Plantar plates: Unremarkable  Webspaces:  Dumbbell shaped soft tissue prominence within the first and second web spaces (short axis image 23 and 20, respectively) with mild fibrosis and soft tissue edema. No intermetatarsal bursitis.  Tendons:  Vertical longitudinal split tear of the distal flexor hallucis longus tendon spanning approximately 26 mm from the first metatarsophalangeal joint to the proximal phalangeal shaft, superimposed on moderate tendinosis (short axis images 19-26). No tenosynovitis. The remainder of the visualized flexor and extensor tendons are unremarkable.  Plantar fascia:  Normal visualized distal segments of the plantar fascial cords.  Musculature: Normal intrinsic muscles of the forefoot without edema or atrophy.  Subcutaneous tissues:  Mild edema along the plantar aspect of the first metatarsal head suggestive of adventitial bursitis.  IMPRESSION:  MRI of the left forefoot demonstrates:  1.  Moderate flexor pollicis longus tendinosis with superimposed vertical longitudinal split tear at the proximal great toe as detailed above. 2.  Soft tissue edema along the plantar aspect of the first metatarsophalangeal joint suggestive of adventitial bursitis. 3.  Small first and second web space digital neuromas. No intermetatarsal bursitis  Thank you for the opportunity to participate in the care of this patient.     JHONY WREN MD  - Electronically Signed: 09- 2:44 PM  Physician to Physician Direct Line is:  Confidential Tel: [phone] - Fax: [phone] - [email or web address] Printed: 11- 8:17 AM SHARMIN HARPER (Exam: 09- 11:30 AM) Page 1 of 1

## 2024-08-16 ENCOUNTER — APPOINTMENT (OUTPATIENT)
Dept: INTERNAL MEDICINE | Facility: CLINIC | Age: 34
End: 2024-08-16
Payer: COMMERCIAL

## 2024-08-16 VITALS
TEMPERATURE: 98.1 F | OXYGEN SATURATION: 99 % | DIASTOLIC BLOOD PRESSURE: 64 MMHG | SYSTOLIC BLOOD PRESSURE: 106 MMHG | HEART RATE: 91 BPM | HEIGHT: 66 IN | BODY MASS INDEX: 18.96 KG/M2 | WEIGHT: 118 LBS

## 2024-08-16 DIAGNOSIS — Z80.0 FAMILY HISTORY OF MALIGNANT NEOPLASM OF DIGESTIVE ORGANS: ICD-10-CM

## 2024-08-16 DIAGNOSIS — Z82.3 FAMILY HISTORY OF STROKE: ICD-10-CM

## 2024-08-16 DIAGNOSIS — H92.09 OTALGIA, UNSPECIFIED EAR: ICD-10-CM

## 2024-08-16 DIAGNOSIS — T50.905A ADVERSE EFFECT OF UNSPECIFIED DRUGS, MEDICAMENTS AND BIOLOGICAL SUBSTANCES, INITIAL ENCOUNTER: ICD-10-CM

## 2024-08-16 PROCEDURE — 99203 OFFICE O/P NEW LOW 30 MIN: CPT

## 2024-08-16 RX ORDER — CIPROFLOXACIN 0.5 MG/.25ML
0.2 SOLUTION/ DROPS AURICULAR (OTIC)
Qty: 1 | Refills: 0 | Status: ACTIVE | COMMUNITY
Start: 2024-08-16 | End: 1900-01-01

## 2024-08-16 NOTE — HEALTH RISK ASSESSMENT
[Never (0 pts)] : Never (0 points) [No] : In the past 12 months have you used drugs other than those required for medical reasons? No [0] : 2) Feeling down, depressed, or hopeless: Not at all (0) [Patient reported PAP Smear was normal] : Patient reported PAP Smear was normal [Never] : Never [Audit-CScore] : 0 [[id number]] : 0 [EyeExamDate] : 11/2023 [PapSmearDate] : 09/2023

## 2024-08-16 NOTE — PHYSICAL EXAM
[No Acute Distress] : no acute distress [Normal Sclera/Conjunctiva] : normal sclera/conjunctiva [Normal Oropharynx] : the oropharynx was normal [No JVD] : no jugular venous distention [Normal Rate] : normal rate  [No Edema] : there was no peripheral edema [Soft] : abdomen soft [Normal Posterior Cervical Nodes] : no posterior cervical lymphadenopathy [Normal Anterior Cervical Nodes] : no anterior cervical lymphadenopathy [No Joint Swelling] : no joint swelling [Grossly Normal Strength/Tone] : grossly normal strength/tone [Memory Grossly Normal] : memory grossly normal [Normal Affect] : the affect was normal [de-identified] : Left ear shows effusion [de-identified] : Mild vesicular rash on the knuckles of her right and left hand

## 2024-08-16 NOTE — HISTORY OF PRESENT ILLNESS
[FreeTextEntry1] : Acute visit [de-identified] : 34-year-old female new to our practice, already scheduled to see Dr. Leavitt for a physical, for an acute visit  Reports use Debrox to clear the earwax may be 20 days ago followed by ear pain+ fever  around 2 weeks ago, also some problem hearing, left side, to the urgent care, was given doxycycline, and all ofloxacin eardrops.  Reports developed a rash on the third or fourth day of doxycycline with a lot of itching, continue the doxycycline for 6 days and eventually stop the seventh dose because of worsening itching, rash.  Today presents to get the rash evaluated Reports the ear pain is more or less resolved just has mild ear fullness [place] any fever any chills  allergies: Previous allergies to Augmentin reports vomiting

## 2024-08-16 NOTE — REVIEW OF SYSTEMS
CONDITION STABLE ASLEEPY EYES CLOSED VS  STABLE OFF LEVOPHED DRIP [Earache] : earache [Skin Rash] : skin rash [Chest Pain] : no chest pain [Palpitations] : no palpitations [Shortness Of Breath] : no shortness of breath [Abdominal Pain] : no abdominal pain [Dysuria] : no dysuria [Joint Pain] : no joint pain [Headache] : no headache [Suicidal] : not suicidal

## 2024-08-19 ENCOUNTER — APPOINTMENT (OUTPATIENT)
Dept: OTOLARYNGOLOGY | Facility: CLINIC | Age: 34
End: 2024-08-19
Payer: COMMERCIAL

## 2024-08-19 ENCOUNTER — NON-APPOINTMENT (OUTPATIENT)
Age: 34
End: 2024-08-19

## 2024-08-19 VITALS
HEIGHT: 66 IN | DIASTOLIC BLOOD PRESSURE: 74 MMHG | HEART RATE: 55 BPM | WEIGHT: 118 LBS | SYSTOLIC BLOOD PRESSURE: 117 MMHG | TEMPERATURE: 98.1 F | BODY MASS INDEX: 18.96 KG/M2

## 2024-08-19 DIAGNOSIS — J34.2 DEVIATED NASAL SEPTUM: ICD-10-CM

## 2024-08-19 DIAGNOSIS — H61.21 IMPACTED CERUMEN, RIGHT EAR: ICD-10-CM

## 2024-08-19 DIAGNOSIS — H69.91 UNSPECIFIED EUSTACHIAN TUBE DISORDER, RIGHT EAR: ICD-10-CM

## 2024-08-19 LAB
ALBUMIN SERPL ELPH-MCNC: 4.7 G/DL
ALP BLD-CCNC: 71 U/L
ALT SERPL-CCNC: 13 U/L
ANION GAP SERPL CALC-SCNC: 12 MMOL/L
AST SERPL-CCNC: 18 U/L
BASOPHILS # BLD AUTO: 0.07 K/UL
BASOPHILS NFR BLD AUTO: 1.2 %
BILIRUB SERPL-MCNC: 0.5 MG/DL
BUN SERPL-MCNC: 9 MG/DL
CALCIUM SERPL-MCNC: 9.9 MG/DL
CHLORIDE SERPL-SCNC: 103 MMOL/L
CO2 SERPL-SCNC: 24 MMOL/L
CREAT SERPL-MCNC: 0.98 MG/DL
EGFR: 77 ML/MIN/1.73M2
EOSINOPHIL # BLD AUTO: 0.3 K/UL
EOSINOPHIL NFR BLD AUTO: 5 %
GLUCOSE SERPL-MCNC: 84 MG/DL
HCT VFR BLD CALC: 38.7 %
HGB BLD-MCNC: 12.9 G/DL
IMM GRANULOCYTES NFR BLD AUTO: 0.3 %
LYMPHOCYTES # BLD AUTO: 1.65 K/UL
LYMPHOCYTES NFR BLD AUTO: 27.2 %
MAN DIFF?: NORMAL
MCHC RBC-ENTMCNC: 30.4 PG
MCHC RBC-ENTMCNC: 33.3 GM/DL
MCV RBC AUTO: 91.3 FL
MONOCYTES # BLD AUTO: 0.75 K/UL
MONOCYTES NFR BLD AUTO: 12.4 %
NEUTROPHILS # BLD AUTO: 3.27 K/UL
NEUTROPHILS NFR BLD AUTO: 53.9 %
PLATELET # BLD AUTO: 275 K/UL
POTASSIUM SERPL-SCNC: 4 MMOL/L
PROT SERPL-MCNC: 7.7 G/DL
RBC # BLD: 4.24 M/UL
RBC # FLD: 12.3 %
SODIUM SERPL-SCNC: 139 MMOL/L
WBC # FLD AUTO: 6.06 K/UL

## 2024-08-19 PROCEDURE — 99203 OFFICE O/P NEW LOW 30 MIN: CPT | Mod: 25

## 2024-08-19 PROCEDURE — 31231 NASAL ENDOSCOPY DX: CPT

## 2024-08-19 NOTE — HISTORY OF PRESENT ILLNESS
[de-identified] : Patient comes in with a  recent left and right ear infection. She was told the left ear was worse. She saw another doctor and was given ear drops and oral antibiotics. She started to have a reaction to the medication so stopped it. She reports that she saw her PCP as well and they saw some continued drainage in the right ear. She still has some mild pressure in the right ear  She has been using flonase that did help her  ear pressure  SHe has been getting ear infections every 6 months or so.

## 2024-08-19 NOTE — ASSESSMENT
[FreeTextEntry1] : Patient with a left ear discomfort was treated with antibiotics and Floxin otic drops had a bit of a reaction on the antibiotics so stopped here for evaluation and examination ears are perfectly fine she has some debris in her right external auditory canal which was removed endoscopically she has a deviated septum which is the etiology of her pressure discomfort put her on Flonase told her to continue to for a month fully expect that to resolve her symptoms obviously if this becomes a recurring eustachian tube dysfunction issue consideration for septoplasty in the future should be given.

## 2024-08-19 NOTE — END OF VISIT
[FreeTextEntry3] : I, Dr. Luther personally performed the evaluation and management (E/M) services including all necessary procedures, for this new patient. That E/M includes conducting the clinically appropriate initial history &/or exam, assessing all conditions, and establishing the plan of care. Today, my DARYL, Rossy Flores, was here to observe &/or participate in the visit & follow plan of care established by me.

## 2024-08-19 NOTE — PHYSICAL EXAM
[Midline] : trachea located in midline position [Normal] : no rashes [de-identified] : cerumen in the ear canals; once removed normal EACs

## 2024-08-19 NOTE — CONSULT LETTER
[Dear  ___] : Dear  [unfilled], [Consult Letter:] : I had the pleasure of evaluating your patient, [unfilled]. [Please see my note below.] : Please see my note below. [Consult Closing:] : Thank you very much for allowing me to participate in the care of this patient.  If you have any questions, please do not hesitate to contact me. [Sincerely,] : Sincerely, [FreeTextEntry3] : Brain Luther MD [facility] Associated Professor, [place]

## 2025-05-19 ENCOUNTER — NON-APPOINTMENT (OUTPATIENT)
Age: 35
End: 2025-05-19

## 2025-05-21 ENCOUNTER — APPOINTMENT (OUTPATIENT)
Dept: INTERNAL MEDICINE | Facility: CLINIC | Age: 35
End: 2025-05-21
Payer: COMMERCIAL

## 2025-05-21 ENCOUNTER — NON-APPOINTMENT (OUTPATIENT)
Age: 35
End: 2025-05-21

## 2025-05-21 VITALS
TEMPERATURE: 98.7 F | OXYGEN SATURATION: 99 % | HEIGHT: 66 IN | SYSTOLIC BLOOD PRESSURE: 117 MMHG | WEIGHT: 118 LBS | HEART RATE: 62 BPM | DIASTOLIC BLOOD PRESSURE: 65 MMHG | BODY MASS INDEX: 18.96 KG/M2

## 2025-05-21 DIAGNOSIS — R10.2 PELVIC AND PERINEAL PAIN: ICD-10-CM

## 2025-05-21 DIAGNOSIS — H69.91 UNSPECIFIED EUSTACHIAN TUBE DISORDER, RIGHT EAR: ICD-10-CM

## 2025-05-21 DIAGNOSIS — Z87.09 PERSONAL HISTORY OF OTHER DISEASES OF THE RESPIRATORY SYSTEM: ICD-10-CM

## 2025-05-21 DIAGNOSIS — R31.29 OTHER MICROSCOPIC HEMATURIA: ICD-10-CM

## 2025-05-21 DIAGNOSIS — Z15.09 GENETIC SUSCEPTIBILITY TO MALIGNANT NEOPLASM OF BREAST: ICD-10-CM

## 2025-05-21 DIAGNOSIS — Z09 ENCOUNTER FOR FOLLOW-UP EXAMINATION AFTER COMPLETED TREATMENT FOR CONDITIONS OTHER THAN MALIGNANT NEOPLASM: ICD-10-CM

## 2025-05-21 DIAGNOSIS — M23.92 UNSPECIFIED INTERNAL DERANGEMENT OF LEFT KNEE: ICD-10-CM

## 2025-05-21 DIAGNOSIS — Z15.01 GENETIC SUSCEPTIBILITY TO MALIGNANT NEOPLASM OF BREAST: ICD-10-CM

## 2025-05-21 DIAGNOSIS — Z83.3 FAMILY HISTORY OF DIABETES MELLITUS: ICD-10-CM

## 2025-05-21 DIAGNOSIS — Z80.42 FAMILY HISTORY OF MALIGNANT NEOPLASM OF PROSTATE: ICD-10-CM

## 2025-05-21 DIAGNOSIS — Z86.69 PERSONAL HISTORY OF OTHER DISEASES OF THE NERVOUS SYSTEM AND SENSE ORGANS: ICD-10-CM

## 2025-05-21 DIAGNOSIS — Z00.00 ENCOUNTER FOR GENERAL ADULT MEDICAL EXAMINATION W/OUT ABNORMAL FINDINGS: ICD-10-CM

## 2025-05-21 DIAGNOSIS — Z80.0 FAMILY HISTORY OF MALIGNANT NEOPLASM OF DIGESTIVE ORGANS: ICD-10-CM

## 2025-05-21 DIAGNOSIS — Z87.42 PERSONAL HISTORY OF OTHER DISEASES OF THE FEMALE GENITAL TRACT: ICD-10-CM

## 2025-05-21 DIAGNOSIS — J45.20 MILD INTERMITTENT ASTHMA, UNCOMPLICATED: ICD-10-CM

## 2025-05-21 DIAGNOSIS — Z34.90 ENCOUNTER FOR SUPERVISION OF NORMAL PREGNANCY, UNSPECIFIED, UNSPECIFIED TRIMESTER: ICD-10-CM

## 2025-05-21 DIAGNOSIS — Z82.3 FAMILY HISTORY OF STROKE: ICD-10-CM

## 2025-05-21 DIAGNOSIS — R23.2 FLUSHING: ICD-10-CM

## 2025-05-21 DIAGNOSIS — N83.299 OTHER OVARIAN CYST, UNSPECIFIED SIDE: ICD-10-CM

## 2025-05-21 DIAGNOSIS — M25.561 PAIN IN RIGHT KNEE: ICD-10-CM

## 2025-05-21 DIAGNOSIS — N92.6 IRREGULAR MENSTRUATION, UNSPECIFIED: ICD-10-CM

## 2025-05-21 DIAGNOSIS — M79.672 PAIN IN LEFT FOOT: ICD-10-CM

## 2025-05-21 DIAGNOSIS — Z88.9 ALLERGY STATUS TO UNSPECIFIED DRUGS, MEDICAMENTS AND BIOLOGICAL SUBSTANCES: ICD-10-CM

## 2025-05-21 DIAGNOSIS — M77.52 OTHER ENTHESOPATHY OF LT FOOT AND ANKLE: ICD-10-CM

## 2025-05-21 DIAGNOSIS — H61.21 IMPACTED CERUMEN, RIGHT EAR: ICD-10-CM

## 2025-05-21 DIAGNOSIS — M25.562 PAIN IN RIGHT KNEE: ICD-10-CM

## 2025-05-21 PROCEDURE — 93000 ELECTROCARDIOGRAM COMPLETE: CPT

## 2025-05-21 PROCEDURE — 99395 PREV VISIT EST AGE 18-39: CPT | Mod: 25

## 2025-05-21 RX ORDER — ALBUTEROL 90 MCG
90 AEROSOL (GRAM) INHALATION
Refills: 0 | Status: DISCONTINUED | COMMUNITY
End: 2025-05-21

## 2025-05-21 RX ORDER — ALBUTEROL SULFATE 90 UG/1
108 (90 BASE) INHALANT RESPIRATORY (INHALATION)
Qty: 1 | Refills: 1 | Status: ACTIVE | COMMUNITY
Start: 2025-05-21 | End: 1900-01-01